# Patient Record
Sex: MALE | Race: WHITE
[De-identification: names, ages, dates, MRNs, and addresses within clinical notes are randomized per-mention and may not be internally consistent; named-entity substitution may affect disease eponyms.]

---

## 2017-04-07 ENCOUNTER — APPOINTMENT (OUTPATIENT)
Dept: RADIOLOGY | Facility: CLINIC | Age: 27
End: 2017-04-07

## 2017-04-07 ENCOUNTER — OUTPATIENT (OUTPATIENT)
Dept: OUTPATIENT SERVICES | Facility: HOSPITAL | Age: 27
LOS: 1 days | End: 2017-04-07
Payer: COMMERCIAL

## 2017-04-07 DIAGNOSIS — Z00.8 ENCOUNTER FOR OTHER GENERAL EXAMINATION: ICD-10-CM

## 2017-04-07 PROBLEM — Z00.00 ENCOUNTER FOR PREVENTIVE HEALTH EXAMINATION: Status: ACTIVE | Noted: 2017-04-07

## 2017-04-07 PROCEDURE — 71046 X-RAY EXAM CHEST 2 VIEWS: CPT

## 2022-04-19 ENCOUNTER — NON-APPOINTMENT (OUTPATIENT)
Age: 32
End: 2022-04-19

## 2022-04-22 ENCOUNTER — LABORATORY RESULT (OUTPATIENT)
Age: 32
End: 2022-04-22

## 2022-04-22 ENCOUNTER — APPOINTMENT (OUTPATIENT)
Dept: RHEUMATOLOGY | Facility: CLINIC | Age: 32
End: 2022-04-22
Payer: COMMERCIAL

## 2022-04-22 VITALS
HEART RATE: 67 BPM | HEIGHT: 71 IN | TEMPERATURE: 96.9 F | OXYGEN SATURATION: 99 % | BODY MASS INDEX: 21.7 KG/M2 | DIASTOLIC BLOOD PRESSURE: 76 MMHG | SYSTOLIC BLOOD PRESSURE: 117 MMHG | WEIGHT: 155 LBS

## 2022-04-22 PROCEDURE — 36415 COLL VENOUS BLD VENIPUNCTURE: CPT

## 2022-04-22 PROCEDURE — 99205 OFFICE O/P NEW HI 60 MIN: CPT | Mod: 25

## 2022-04-22 NOTE — HISTORY OF PRESENT ILLNESS
[FreeTextEntry1] : 33 y/o male referred to rheumatology for management of RA.\par \par Pt started to have symptoms 7 years ago. Pt was diagnosed with RA and ?gout and treated by Dr. Last. Pt was on SSZ which worked for 2 years then stopped working. Then he was on MTX x 3 months but stopped due non-improvement. Pt was about to start Humira but pt's symptoms resolved with change in diet. Pt started to have more symptoms more recently. Pt was treated for gout with allopurinol and colchicine but did not help with the flares.\par Pt has been on PDN 20mg/day x 6 weeks.\par \par Pt has symptoms of swelling pain worst at night - migratory - hands, arms, feet, ankles, typically 1 side a time.\par Denies other symptoms.\par \par Patient denies fatigue, fever, chills, nasopharyngeal ulcers, chest pain, abdominal pain, cough, SOB, nausea, vomiting, diarrhea, blood in stool, hematuria, rash, Raynaud's, dry eyes, dry mouth, Hx of DVT/PEs.\par ROS negative unless otherwise noted above.\par \par Cousin - RA\par \par WORKUP:\par Remarkable for: ESR 45 (2/2017), CRP 5.0 (2/2017)\par Normal/neg: CBC, CMP, EWA, RF, CCP, HLA-B27, 14.3.3 eta, ACE level, tick-borne disease panel, HSV, HIV, Chlamydia, RPR, Hep B/C, Quantiferon TB\par CT L ankle (2/2017): evidence of osteopenia of ankle bones. Large os trigonum the posterior aspect of the ankle.\par MR L ankle (2/2017): Partial tear anterior tibiofibular ligament, anterior talofibular ligament, and calcaneofibular ligament with sprain deep fibers of the deltoid. Nondisplaced fracture plantar medial talar body adjacent to the medial facet of the subtalar joint. Contusion distal fibula. Large tibiotalar joint effusion. Prominent os trigonum with posttraumatic disruption of the synchondrosis. Posterior soft tissue edema with synovitis and tenosynovitis of the flexor hallucis.

## 2022-04-22 NOTE — ASSESSMENT
[FreeTextEntry1] : 33 y/o male referred to rheumatology for management of RA.\par \par Pt started to have symptoms 7 years ago. Pt was diagnosed with RA and ?gout and treated by Dr. Last. Pt was on SSZ which worked for 2 years then stopped working. Then he was on MTX x 3 months but stopped due non-improvement. Pt was about to start Humira but pt's symptoms resolved with change in diet. Pt started to have more symptoms more recently. Pt was treated for gout with allopurinol and colchicine but did not help with the flares.\par Pt has been on PDN 20mg/day x 6 weeks.\par Pt has symptoms of swelling pain worst at night - migratory - hands, arms, feet, ankles, typically 1 side a time.\par Denies other symptoms.\par Cousin - RA\par Workup by previous rheum showed high inflammatory markers but negative markers for autoimmune diseases, 14.3.3 eta.\par \par Patient has recurrent migratory inflammatory arthritis. Differentials include seronegative RA, spondyloarthritis, crystalline arthritis. At this time, given no strong evidence of other diseases, patient should be treated for seronegative RA.\par Pt has failed SSZ and MTX.\par \par - Obtain labwork to evaluate inflammatory arthritis and medication safety\par - Obtain XR of b/l hands/wrists, ankles/feet\par - Will call pt with results and if safety labs negative, start Humira. RTC 2 months for follow up.

## 2022-04-23 LAB
25(OH)D3 SERPL-MCNC: 34.4 NG/ML
ALBUMIN SERPL ELPH-MCNC: 4.5 G/DL
ALP BLD-CCNC: 58 U/L
ALT SERPL-CCNC: 23 U/L
ANION GAP SERPL CALC-SCNC: 14 MMOL/L
AST SERPL-CCNC: 21 U/L
BASOPHILS # BLD AUTO: 0.03 K/UL
BASOPHILS NFR BLD AUTO: 0.3 %
BILIRUB SERPL-MCNC: 0.2 MG/DL
BUN SERPL-MCNC: 14 MG/DL
CALCIUM SERPL-MCNC: 9.6 MG/DL
CHLORIDE SERPL-SCNC: 101 MMOL/L
CHOLEST SERPL-MCNC: 248 MG/DL
CO2 SERPL-SCNC: 27 MMOL/L
CREAT SERPL-MCNC: 0.92 MG/DL
CRP SERPL-MCNC: 8 MG/L
EGFR: 113 ML/MIN/1.73M2
EOSINOPHIL # BLD AUTO: 0.11 K/UL
EOSINOPHIL NFR BLD AUTO: 1.2 %
ERYTHROCYTE [SEDIMENTATION RATE] IN BLOOD BY WESTERGREN METHOD: 21 MM/HR
FERRITIN SERPL-MCNC: 137 NG/ML
GLUCOSE SERPL-MCNC: 96 MG/DL
HBV CORE IGG+IGM SER QL: NONREACTIVE
HBV SURFACE AB SER QL: REACTIVE
HBV SURFACE AG SER QL: NONREACTIVE
HCT VFR BLD CALC: 47.4 %
HCV AB SER QL: NONREACTIVE
HCV S/CO RATIO: 0.12 S/CO
HDLC SERPL-MCNC: 88 MG/DL
HGB BLD-MCNC: 14.7 G/DL
IMM GRANULOCYTES NFR BLD AUTO: 0.2 %
IRON SATN MFR SERPL: 22 %
IRON SERPL-MCNC: 48 UG/DL
LDLC SERPL CALC-MCNC: 144 MG/DL
LYMPHOCYTES # BLD AUTO: 1.74 K/UL
LYMPHOCYTES NFR BLD AUTO: 18.8 %
MAN DIFF?: NORMAL
MCHC RBC-ENTMCNC: 27.5 PG
MCHC RBC-ENTMCNC: 31 GM/DL
MCV RBC AUTO: 88.8 FL
MONOCYTES # BLD AUTO: 0.61 K/UL
MONOCYTES NFR BLD AUTO: 6.6 %
NEUTROPHILS # BLD AUTO: 6.74 K/UL
NEUTROPHILS NFR BLD AUTO: 72.9 %
NONHDLC SERPL-MCNC: 160 MG/DL
PLATELET # BLD AUTO: 280 K/UL
POTASSIUM SERPL-SCNC: 5.5 MMOL/L
PROT SERPL-MCNC: 7.4 G/DL
RBC # BLD: 5.34 M/UL
RBC # FLD: 14.6 %
RHEUMATOID FACT SER QL: <10 IU/ML
SODIUM SERPL-SCNC: 141 MMOL/L
THYROGLOB AB SERPL-ACNC: <20 IU/ML
THYROPEROXIDASE AB SERPL IA-ACNC: 20.1 IU/ML
TIBC SERPL-MCNC: 214 UG/DL
TRIGL SERPL-MCNC: 82 MG/DL
TSH SERPL-ACNC: 1.52 UIU/ML
UIBC SERPL-MCNC: 166 UG/DL
URATE SERPL-MCNC: 4.1 MG/DL
WBC # FLD AUTO: 9.25 K/UL

## 2022-04-25 LAB
ACE BLD-CCNC: 24 U/L
C3 SERPL-MCNC: 111 MG/DL
C4 SERPL-MCNC: 30 MG/DL
DEPRECATED KAPPA LC FREE/LAMBDA SER: 1.38 RATIO
ENA RNP AB SER IA-ACNC: <0.2 AL
ENA SM AB SER IA-ACNC: <0.2 AL
ENA SS-A AB SER IA-ACNC: <0.2 AL
ENA SS-B AB SER IA-ACNC: <0.2 AL
IGA SER QL IEP: 544 MG/DL
IGG SER QL IEP: 1119 MG/DL
IGM SER QL IEP: 170 MG/DL
KAPPA LC CSF-MCNC: 1.21 MG/DL
KAPPA LC SERPL-MCNC: 1.67 MG/DL
M TB IFN-G BLD-IMP: NEGATIVE
QUANTIFERON TB PLUS MITOGEN MINUS NIL: 9.96 IU/ML
QUANTIFERON TB PLUS NIL: 0.04 IU/ML
QUANTIFERON TB PLUS TB1 MINUS NIL: 0.1 IU/ML
QUANTIFERON TB PLUS TB2 MINUS NIL: 0.05 IU/ML
TESTOST FREE SERPL-MCNC: 2.9 PG/ML
TESTOST SERPL-MCNC: 339 NG/DL

## 2022-04-26 LAB
CCP AB SER IA-ACNC: <8 UNITS
DSDNA AB SER-ACNC: <12 IU/ML
RF+CCP IGG SER-IMP: NEGATIVE

## 2022-04-27 LAB
ALBUMIN MFR SERPL ELPH: 58.5 %
ALBUMIN SERPL-MCNC: 4.3 G/DL
ALBUMIN/GLOB SERPL: 1.4 RATIO
ALPHA1 GLOB MFR SERPL ELPH: 4.5 %
ALPHA1 GLOB SERPL ELPH-MCNC: 0.3 G/DL
ALPHA2 GLOB MFR SERPL ELPH: 8.6 %
ALPHA2 GLOB SERPL ELPH-MCNC: 0.6 G/DL
ANA SER IF-ACNC: NEGATIVE
B-GLOBULIN MFR SERPL ELPH: 13.1 %
B-GLOBULIN SERPL ELPH-MCNC: 1 G/DL
GAMMA GLOB FLD ELPH-MCNC: 1.1 G/DL
GAMMA GLOB MFR SERPL ELPH: 15.3 %
INTERPRETATION SERPL IEP-IMP: NORMAL
PROT SERPL-MCNC: 7.4 G/DL
PROT SERPL-MCNC: 7.4 G/DL

## 2022-04-28 DIAGNOSIS — R79.89 OTHER SPECIFIED ABNORMAL FINDINGS OF BLOOD CHEMISTRY: ICD-10-CM

## 2022-04-29 ENCOUNTER — TRANSCRIPTION ENCOUNTER (OUTPATIENT)
Age: 32
End: 2022-04-29

## 2022-04-29 LAB — HLA-B27 RELATED AG QL: NEGATIVE

## 2022-05-04 LAB — 14-3-3 ETA AG SER IA-MCNC: <0.2 NG/ML

## 2022-06-24 ENCOUNTER — APPOINTMENT (OUTPATIENT)
Dept: RHEUMATOLOGY | Facility: CLINIC | Age: 32
End: 2022-06-24

## 2022-06-24 VITALS
RESPIRATION RATE: 16 BRPM | HEIGHT: 71 IN | SYSTOLIC BLOOD PRESSURE: 125 MMHG | TEMPERATURE: 97.1 F | DIASTOLIC BLOOD PRESSURE: 67 MMHG | BODY MASS INDEX: 22.4 KG/M2 | OXYGEN SATURATION: 100 % | HEART RATE: 60 BPM | WEIGHT: 160 LBS

## 2022-06-24 PROCEDURE — 99214 OFFICE O/P EST MOD 30 MIN: CPT

## 2022-06-24 NOTE — ASSESSMENT
[FreeTextEntry1] : 31 y/o male presents as follow up for management of RA. \par Pt started to have symptoms 7 years ago. Pt was diagnosed with RA and ?gout and treated by Dr. Last. Pt was on SSZ which worked for 2 years then stopped working. Then he was on MTX x 3 months but stopped due to non-improvement. Pt was about to start Humira but pt's symptoms resolved with change in diet. Pt started to have more symptoms more recently. Pt was treated for gout with allopurinol and colchicine but did not help with the flares. \par Pt has been on PDN 20mg/day x 6 weeks. \par Pt has symptoms of swelling pain worst at night - migratory - hands, arms, feet, ankles, typically 1 side a time. \par Denies other symptoms. \par Cousin - RA \par \par Patient has recurrent migratory inflammatory arthritis. Differentials include seronegative RA, spondyloarthritis, crystalline arthritis. At this time, given no strong evidence of other diseases, patient should be treated for seronegative RA. \par Pt has failed SSZ and MTX.\par \par Workup by previous rheum showed high inflammatory markers but negative markers for autoimmune diseases, 14.3.3 eta. Pt was started on Humira by me on 4/2022 with great improvement in his inflammatory arthritis.\par \par - c/w Humira 40mg SQ [id number]\par - Pt will obtain XR of b/l hands/wrists, ankles/feet ordered last visit\par - Pt deferred endocrinology follow up for low testosterone which may not be clinically significant - pt will let me know if he wants the referral again.\par - RTC 4 months for follow up.\par

## 2022-06-24 NOTE — HISTORY OF PRESENT ILLNESS
[FreeTextEntry1] : HISTORY: \par 31 y/o male presents as follow up for management of RA. \par Pt started to have symptoms 7 years ago. Pt was diagnosed with RA and ?gout and treated by Dr. Last. Pt was on SSZ which worked for 2 years then stopped working. Then he was on MTX x 3 months but stopped due to non-improvement. Pt was about to start Humira but pt's symptoms resolved with change in diet. Pt started to have more symptoms more recently. Pt was treated for gout with allopurinol and colchicine but did not help with the flares. \par Pt has been on PDN 20mg/day x 6 weeks. \par Pt has symptoms of swelling pain worst at night - migratory - hands, arms, feet, ankles, typically 1 side a time. \par Denies other symptoms. \par Cousin - RA \par \par Workup by previous rheum showed high inflammatory markers but negative markers for autoimmune diseases, 14.3.3 eta. \par \par Patient has recurrent migratory inflammatory arthritis. Differentials include seronegative RA, spondyloarthritis, crystalline arthritis. At this time, given no strong evidence of other diseases, patient should be treated for seronegative RA. \par Pt has failed SSZ and MTX. \par \par INTERVAL HISTORY: \par Pt started Humira about 6 weeks ago, since then, patient reports great improvement in his joint pains and is back to working out. Reports some pain in R 1st MTP bunion with use of foot. Denies any side effects of Humira and feels well in general. Denies any systemic symptoms. Pt has not done XR from last visit yet.\par \par WORKUP: \par Remarkable for (4/2022): CRP 8 (nml <= 4), ESR 21 \par Normal/neg (4/2022): CBC, CMP, EWA, dsDNA, Brady, RNP, SSA, SSB, C3, C4, Thyroid Ab, ANCAs, RF, CCP, HLA-B27, ACE level, 14.3.3 eta, uric acid, DAYANARA, TSH, ferritin, iron panel, Hep B/C panel, Quantiferon TB \par Remarkable for: ESR 45 (2/2017), CRP 5.0 (2/2017) \par Normal/neg: CBC, CMP, EWA, RF, CCP, HLA-B27, 14.3.3 eta, ACE level, tick-borne disease panel, HSV, HIV, Chlamydia, RPR, Hep B/C, Quantiferon TB \par CT L ankle (2/2017): evidence of osteopenia of ankle bones. Large os trigonum the posterior aspect of the ankle. \par MR L ankle (2/2017): Partial tear anterior tibiofibular ligament, anterior talofibular ligament, and calcaneofibular ligament with sprain deep fibers of the deltoid. Nondisplaced fracture plantar medial talar body adjacent to the medial facet of the subtalar joint. Contusion distal fibula. Large tibiotalar joint effusion. Prominent os trigonum with posttraumatic disruption of the synchondrosis. Posterior soft tissue edema with synovitis and tenosynovitis of the flexor hallucis \par

## 2022-09-23 ENCOUNTER — APPOINTMENT (OUTPATIENT)
Dept: RHEUMATOLOGY | Facility: CLINIC | Age: 32
End: 2022-09-23

## 2022-09-23 VITALS
HEIGHT: 70 IN | SYSTOLIC BLOOD PRESSURE: 122 MMHG | BODY MASS INDEX: 25.05 KG/M2 | OXYGEN SATURATION: 100 % | TEMPERATURE: 97 F | DIASTOLIC BLOOD PRESSURE: 67 MMHG | WEIGHT: 175 LBS | HEART RATE: 70 BPM

## 2022-09-23 PROCEDURE — 36415 COLL VENOUS BLD VENIPUNCTURE: CPT

## 2022-09-23 PROCEDURE — 99214 OFFICE O/P EST MOD 30 MIN: CPT | Mod: 25

## 2022-09-23 NOTE — HISTORY OF PRESENT ILLNESS
[FreeTextEntry1] : 31 y/o male presents as follow up for management of seronegative RA.  \par Pt started to have symptoms 7 years ago. Pt was diagnosed with RA and ?gout and treated by Dr. Dnun. Pt was on SSZ which worked for 2 years then stopped working. Then he was on MTX x 3 months but stopped due to non-improvement. Pt was about to start Humira but pt's symptoms resolved with change in diet. Pt started to have more symptoms more recently. Pt was treated for gout with allopurinol and colchicine but did not help with the flares.  \par Pt has been on PDN 20mg/day x 6 weeks.  \par Pt has symptoms of swelling pain worst at night - migratory - hands, arms, feet, ankles, typically 1 side a time.  \par Denies other symptoms.  \par Cousin - RA  \par \par Patient has recurrent migratory inflammatory arthritis. Differentials include seronegative RA, spondyloarthritis, crystalline arthritis. At this time, given no strong evidence of other diseases, patient should be treated for seronegative RA.  \par Pt has failed SSZ and MTX. \par \par Workup by previous rheumatologist and I showed high inflammatory markers but negative markers for autoimmune diseases, 14.3.3 eta. Pt was started on Humira by me on 4/2022 for seronegative RA with great improvement in his inflammatory arthritis. \par \par INTERVAL HISTORY: \par Pt continues to do well on Humira without any side effects. Pt has not had any major flares of RA since last visit and did not require any PDN.\par Denies any other concerns or symptoms.\par \par WORKUP:  \par Remarkable for (4/2022): CRP 8 (nml <= 4), ESR 21  \par Normal/neg (4/2022): CBC, CMP, EWA, dsDNA, Brady, RNP, SSA, SSB, C3, C4, Thyroid Ab, ANCAs, RF, CCP, HLA-B27, ACE level, 14.3.3 eta, uric acid, DAYANARA, TSH, ferritin, iron panel, Hep B/C panel, Quantiferon TB  \par Normal/neg (Prior workup): CBC, CMP, EWA, RF, CCP, HLA-B27, 14.3.3 eta, ACE level, tick-borne disease panel, HSV, HIV, Chlamydia, RPR, Hep B/C, Quantiferon TB  \par CT L ankle (2/2017): evidence of osteopenia of ankle bones. Large os trigonum the posterior aspect of the ankle.  \par MR L ankle (2/2017): Partial tear anterior tibiofibular ligament, anterior talofibular ligament, and calcaneofibular ligament with sprain deep fibers of the deltoid. Nondisplaced fracture plantar medial talar body adjacent to the medial facet of the subtalar joint. Contusion distal fibula. Large tibiotalar joint effusion. Prominent os trigonum with posttraumatic disruption of the synchondrosis. Posterior soft tissue edema with synovitis and tenosynovitis of the flexor hallucis\par

## 2022-09-23 NOTE — ASSESSMENT
[FreeTextEntry1] : 33 y/o male presents as follow up for management of seronegative RA.  \par Pt started to have symptoms 7 years ago. Pt was diagnosed with RA and ?gout and treated by Dr. Dunn. Pt was on SSZ which worked for 2 years then stopped working. Then he was on MTX x 3 months but stopped due to non-improvement. Pt was about to start Humira but pt's symptoms resolved with change in diet. Pt started to have more symptoms more recently. Pt was treated for gout with allopurinol and colchicine but did not help with the flares.  \par Pt has been on PDN 20mg/day x 6 weeks.  \par Pt has symptoms of swelling pain worst at night - migratory - hands, arms, feet, ankles, typically 1 side a time.  \par Denies other symptoms.  \par Cousin - RA  \par \par Patient has recurrent migratory inflammatory arthritis. Differentials include seronegative RA, spondyloarthritis, crystalline arthritis. At this time, given no strong evidence of other diseases, patient should be treated for seronegative RA.  \par Pt has failed SSZ and MTX. \par \par Workup by previous rheumatologist and I showed high inflammatory markers but negative markers for autoimmune diseases, 14.3.3 eta. Pt was started on Humira by me on 4/2022 for seronegative RA with great improvement in his inflammatory arthritis. \par \par - Obtain labwork for disease activity and medication safety.\par - c/w Humira 40mg SQ [id number]. Side effects of Humira were discussed with the pt in detail including increased risk of infection, demyelinating disease, re-activation of latent TB, possible increased risk of solid and skin tumors. Advised pt to seek medical care ASAP if he/she has an infection and advised to stop the medication until infection resolves.\par This is a high-risk therapy requiring intense monitoring for toxicity.\par Hepatitis panel and TB negative 4/2022.\par - Ordered XR of b/l hands/wrists, ankles/feet again - to evaluate any erosions and to be used for monitoring.\par - RTC 3 months for follow up. If stable by next visit, will consider follow ups further apart.\par

## 2022-09-24 LAB
ALBUMIN SERPL ELPH-MCNC: 4.8 G/DL
ALP BLD-CCNC: 43 U/L
ALT SERPL-CCNC: 49 U/L
ANION GAP SERPL CALC-SCNC: 10 MMOL/L
AST SERPL-CCNC: 65 U/L
BASOPHILS # BLD AUTO: 0.05 K/UL
BASOPHILS NFR BLD AUTO: 1 %
BILIRUB SERPL-MCNC: 0.6 MG/DL
BUN SERPL-MCNC: 17 MG/DL
CALCIUM SERPL-MCNC: 9.7 MG/DL
CHLORIDE SERPL-SCNC: 105 MMOL/L
CO2 SERPL-SCNC: 26 MMOL/L
CREAT SERPL-MCNC: 1.15 MG/DL
CRP SERPL-MCNC: <3 MG/L
EGFR: 86 ML/MIN/1.73M2
EOSINOPHIL # BLD AUTO: 0.26 K/UL
EOSINOPHIL NFR BLD AUTO: 5.3 %
ERYTHROCYTE [SEDIMENTATION RATE] IN BLOOD BY WESTERGREN METHOD: 3 MM/HR
GLUCOSE SERPL-MCNC: 110 MG/DL
HCT VFR BLD CALC: 41 %
HGB BLD-MCNC: 13.8 G/DL
IMM GRANULOCYTES NFR BLD AUTO: 0 %
LYMPHOCYTES # BLD AUTO: 2.28 K/UL
LYMPHOCYTES NFR BLD AUTO: 46.7 %
MAN DIFF?: NORMAL
MCHC RBC-ENTMCNC: 29.1 PG
MCHC RBC-ENTMCNC: 33.7 GM/DL
MCV RBC AUTO: 86.5 FL
MONOCYTES # BLD AUTO: 0.31 K/UL
MONOCYTES NFR BLD AUTO: 6.4 %
NEUTROPHILS # BLD AUTO: 1.98 K/UL
NEUTROPHILS NFR BLD AUTO: 40.6 %
PLATELET # BLD AUTO: 217 K/UL
POTASSIUM SERPL-SCNC: 4.8 MMOL/L
PROT SERPL-MCNC: 7 G/DL
RBC # BLD: 4.74 M/UL
RBC # FLD: 12.4 %
SODIUM SERPL-SCNC: 141 MMOL/L
WBC # FLD AUTO: 4.88 K/UL

## 2023-01-06 ENCOUNTER — APPOINTMENT (OUTPATIENT)
Dept: RHEUMATOLOGY | Facility: CLINIC | Age: 33
End: 2023-01-06
Payer: COMMERCIAL

## 2023-01-06 VITALS
HEART RATE: 63 BPM | HEIGHT: 70 IN | BODY MASS INDEX: 25.77 KG/M2 | RESPIRATION RATE: 18 BRPM | SYSTOLIC BLOOD PRESSURE: 106 MMHG | DIASTOLIC BLOOD PRESSURE: 68 MMHG | WEIGHT: 180 LBS | OXYGEN SATURATION: 100 %

## 2023-01-06 PROCEDURE — 99214 OFFICE O/P EST MOD 30 MIN: CPT

## 2023-01-06 NOTE — ASSESSMENT
[FreeTextEntry1] : 33 y/o male presents as follow up for management of seronegative RA.  \par Pt started to have symptoms 7 years ago. Pt was diagnosed with RA and ?gout and treated by Dr. Dunn. Pt was on SSZ which worked for 2 years then stopped working. Then he was on MTX x 3 months but stopped due to non-improvement. Pt was about to start Humira but pt's symptoms resolved with change in diet. Pt started to have more symptoms more recently. Pt was treated for gout with allopurinol and colchicine but did not help with the flares.  \par Pt has been on PDN 20mg/day x 6 weeks.  \par Pt has symptoms of swelling pain worst at night - migratory - hands, arms, feet, ankles, typically 1 side a time.  \par Denies other symptoms.  \par Cousin - RA\par \par Patient has recurrent migratory inflammatory arthritis. Given no strong evidence of other causes of inflammatory arthritis, patient will be treated for seronegative RA.  \par Pt has failed SSZ and MTX. \par \par Workup by previous rheumatologist and I showed high inflammatory markers but negative markers for autoimmune diseases, 14.3.3 eta. XR hands/feet without erosions. Pt was started on Humira by me on 4/2022 for seronegative RA with great improvement in his inflammatory arthritis.  \par \par - Will obtain labwork for disease activity and medication safety (including repeat Hep panel and TB) on next visit\par - c/w Humira 40mg SQ [id number]. Side effects of Humira were discussed with the pt in detail including increased risk of infection, demyelinating disease, re-activation of latent TB, possible increased risk of solid and skin tumors. Advised pt to seek medical care ASAP if he/she has an infection and advised to stop the medication until infection resolves. \par This is a high-risk therapy requiring intense monitoring for toxicity. \par Hepatitis panel and TB negative 4/2022. \par - RTC 3 months for follow up. If stable by next visit, will consider follow ups further apart. \par

## 2023-01-06 NOTE — HISTORY OF PRESENT ILLNESS
[FreeTextEntry1] : HISTORY: \par 31 y/o male presents as follow up for management of seronegative RA.  \par Pt started to have symptoms 7 years ago. Pt was diagnosed with RA and ?gout and treated by Dr. Dunn. Pt was on SSZ which worked for 2 years then stopped working. Then he was on MTX x 3 months but stopped due to non-improvement. Pt was about to start Humira but pt's symptoms resolved with change in diet. Pt started to have more symptoms more recently. Pt was treated for gout with allopurinol and colchicine but did not help with the flares.  \par Pt has been on PDN 20mg/day x 6 weeks.  \par Pt has symptoms of swelling pain worst at night - migratory - hands, arms, feet, ankles, typically 1 side a time.  \par Denies other symptoms.  \par Cousin - RA\par \par Patient has recurrent migratory inflammatory arthritis. Differentials include seronegative RA, spondyloarthritis, crystalline arthritis. At this time, given no strong evidence of other diseases, patient should be treated for seronegative RA.  \par Pt has failed SSZ and MTX. \par \par Workup by previous rheumatologist and I showed high inflammatory markers but negative markers for autoimmune diseases, 14.3.3 eta. Pt was started on Humira by me on 4/2022 for seronegative RA with great improvement in his inflammatory arthritis.  \par \par INTERVAL HISTORY: \par Pt has been doing well on Humira without any major flares. Pt has not needed to use any PDN recently.\par Pt denies any new concerns today.\par \par WORKUP:  \par Remarkable for (4/2022): CRP 8 (nml <= 4), ESR 21  \par Normal/neg (4/2022): CBC, CMP, EWA, dsDNA, Brady, RNP, SSA, SSB, C3, C4, Thyroid Ab, ANCAs, RF, CCP, HLA-B27, ACE level, 14.3.3 eta, uric acid, DAYANARA, TSH, ferritin, iron panel, Hep B/C panel, Quantiferon TB  \par Normal/neg (Prior workup): CBC, CMP, EWA, RF, CCP, HLA-B27, 14.3.3 eta, ACE level, tick-borne disease panel, HSV, HIV, Chlamydia, RPR, Hep B/C, Quantiferon TB \par XR b/l hands/wrists (11/2022): Normal\par \par XR b/l ankles/feet (11/2022): Normal \par CT L ankle (2/2017): evidence of osteopenia of ankle bones. Large os trigonum the posterior aspect of the ankle.  \par MR L ankle (2/2017): Partial tear anterior tibiofibular ligament, anterior talofibular ligament, and calcaneofibular ligament with sprain deep fibers of the deltoid. Nondisplaced fracture plantar medial talar body adjacent to the medial facet of the subtalar joint. Contusion distal fibula. Large tibiotalar joint effusion. Prominent os trigonum with posttraumatic disruption of the synchondrosis. Posterior soft tissue edema with synovitis and tenosynovitis of the flexor hallucis \par \par

## 2023-03-31 ENCOUNTER — APPOINTMENT (OUTPATIENT)
Dept: RHEUMATOLOGY | Facility: CLINIC | Age: 33
End: 2023-03-31
Payer: COMMERCIAL

## 2023-03-31 VITALS
RESPIRATION RATE: 18 BRPM | HEART RATE: 60 BPM | DIASTOLIC BLOOD PRESSURE: 75 MMHG | OXYGEN SATURATION: 100 % | SYSTOLIC BLOOD PRESSURE: 120 MMHG | BODY MASS INDEX: 24.34 KG/M2 | WEIGHT: 170 LBS | HEIGHT: 70 IN

## 2023-03-31 PROCEDURE — 36415 COLL VENOUS BLD VENIPUNCTURE: CPT

## 2023-03-31 PROCEDURE — 99214 OFFICE O/P EST MOD 30 MIN: CPT | Mod: 25

## 2023-03-31 RX ORDER — PREDNISONE 5 MG/1
5 TABLET ORAL
Qty: 120 | Refills: 2 | Status: COMPLETED | COMMUNITY
Start: 2022-04-22 | End: 2023-03-31

## 2023-03-31 NOTE — ASSESSMENT
[FreeTextEntry1] : 33 y/o male presents as follow up for management of seronegative RA.  \par Pt started to have symptoms 7 years ago. Pt was diagnosed with RA and ?gout and treated by Dr. Dunn. Pt was on SSZ which worked for 2 years then stopped working. Then he was on MTX x 3 months but stopped due to non-improvement. Pt was about to start Humira but pt's symptoms resolved with change in diet. Pt started to have more symptoms more recently. Pt was treated for gout with allopurinol and colchicine but did not help with the flares.  \par Pt has been on PDN 20mg/day x 6 weeks.  \par Pt has symptoms of swelling pain worst at night - migratory - hands, arms, feet, ankles, typically 1 side a time.  \par Denies other symptoms.  \par Cousin - RA \par \par Patient has recurrent migratory inflammatory arthritis. Given no strong evidence of other causes of inflammatory arthritis, patient will be treated for seronegative RA.  \par Pt has failed SSZ and MTX.  \par \par Workup by previous rheumatologist and I showed high inflammatory markers but negative markers for autoimmune diseases, 14.3.3 eta. XR hands/feet without erosions. Pt was started on Humira by me on 4/2022 for seronegative RA with great improvement in his inflammatory arthritis. \par \par - Obtain labwork for disease activity and medication safety\par - c/w Humira 40mg SQ [id number]. Side effects of Humira were discussed with the pt in detail including increased risk of infection, demyelinating disease, re-activation of latent TB, possible increased risk of solid and skin tumors. Advised pt to seek medical care ASAP if he/she has an infection and advised to stop the medication until infection resolves.  \par This is a high-risk therapy requiring intense monitoring for toxicity.  \par Hepatitis panel and TB negative 4/2022. Repeating today\par - RTC 6 months for follow up. If stable by next visit, will consider follow ups further apart. \par \par

## 2023-03-31 NOTE — HISTORY OF PRESENT ILLNESS
[FreeTextEntry1] : HISTORY: \par 31 y/o male presents as follow up for management of seronegative RA.  \par Pt started to have symptoms 7 years ago. Pt was diagnosed with RA and ?gout and treated by Dr. Dunn. Pt was on SSZ which worked for 2 years then stopped working. Then he was on MTX x 3 months but stopped due to non-improvement. Pt was about to start Humira but pt's symptoms resolved with change in diet. Pt started to have more symptoms more recently. Pt was treated for gout with allopurinol and colchicine but did not help with the flares.  \par Pt has been on PDN 20mg/day x 6 weeks.  \par Pt has symptoms of swelling pain worst at night - migratory - hands, arms, feet, ankles, typically 1 side a time.  \par Denies other symptoms.  \par Cousin - RA \par \par Patient has recurrent migratory inflammatory arthritis. Given no strong evidence of other causes of inflammatory arthritis, patient will be treated for seronegative RA.  \par \par Pt has failed SSZ and MTX.  \par \par Workup by previous rheumatologist and I showed high inflammatory markers but negative markers for autoimmune diseases, 14.3.3 eta. XR hands/feet without erosions. Pt was started on Humira by me on 4/2022 for seronegative RA with great improvement in his inflammatory arthritis. \par \par INTERVAL HISTORY: \par Pt feels well and does not have any concerns today.\par \par WORKUP:  \par Remarkable for (4/2022): CRP 8 (nml <= 4), ESR 21  \par Normal/neg (4/2022): CBC, CMP, EWA, dsDNA, Brady, RNP, SSA, SSB, C3, C4, Thyroid Ab, ANCAs, RF, CCP, HLA-B27, ACE level, 14.3.3 eta, uric acid, DAYANARA, TSH, ferritin, iron panel, Hep B/C panel, Quantiferon TB  \par Normal/neg (Prior workup): CBC, CMP, EWA, RF, CCP, HLA-B27, 14.3.3 eta, ACE level, tick-borne disease panel, HSV, HIV, Chlamydia, RPR, Hep B/C, Quantiferon TB  \par XR b/l hands/wrists (11/2022): Normal \par XR b/l ankles/feet (11/2022): Normal  \par CT L ankle (2/2017): evidence of osteopenia of ankle bones. Large os trigonum the posterior aspect of the ankle.  \par MR L ankle (2/2017): Partial tear anterior tibiofibular ligament, anterior talofibular ligament, and calcaneofibular ligament with sprain deep fibers of the deltoid. Nondisplaced fracture plantar medial talar body adjacent to the medial facet of the subtalar joint. Contusion distal fibula. Large tibiotalar joint effusion. Prominent os trigonum with posttraumatic disruption of the synchondrosis. Posterior soft tissue edema with synovitis and tenosynovitis of the flexor hallucis

## 2023-04-10 LAB
ALBUMIN SERPL ELPH-MCNC: 4.9 G/DL
ALP BLD-CCNC: 41 U/L
ALT SERPL-CCNC: 22 U/L
ANION GAP SERPL CALC-SCNC: 11 MMOL/L
AST SERPL-CCNC: 28 U/L
BASOPHILS # BLD AUTO: 0.04 K/UL
BASOPHILS NFR BLD AUTO: 0.8 %
BILIRUB SERPL-MCNC: 0.5 MG/DL
BUN SERPL-MCNC: 16 MG/DL
CALCIUM SERPL-MCNC: 9.7 MG/DL
CHLORIDE SERPL-SCNC: 104 MMOL/L
CO2 SERPL-SCNC: 27 MMOL/L
CREAT SERPL-MCNC: 1.02 MG/DL
CRP SERPL-MCNC: <3 MG/L
EGFR: 100 ML/MIN/1.73M2
EOSINOPHIL # BLD AUTO: 0.11 K/UL
EOSINOPHIL NFR BLD AUTO: 2.2 %
ERYTHROCYTE [SEDIMENTATION RATE] IN BLOOD BY WESTERGREN METHOD: 2 MM/HR
GLUCOSE SERPL-MCNC: 105 MG/DL
HBV CORE IGG+IGM SER QL: NONREACTIVE
HBV SURFACE AB SER QL: REACTIVE
HBV SURFACE AG SER QL: NONREACTIVE
HCT VFR BLD CALC: 44.9 %
HCV AB SER QL: NONREACTIVE
HCV S/CO RATIO: 0.1 S/CO
HGB BLD-MCNC: 14.6 G/DL
IMM GRANULOCYTES NFR BLD AUTO: 0.4 %
LYMPHOCYTES # BLD AUTO: 2.14 K/UL
LYMPHOCYTES NFR BLD AUTO: 42.4 %
M TB IFN-G BLD-IMP: NEGATIVE
MAN DIFF?: NORMAL
MCHC RBC-ENTMCNC: 28.7 PG
MCHC RBC-ENTMCNC: 32.5 GM/DL
MCV RBC AUTO: 88.2 FL
MONOCYTES # BLD AUTO: 0.34 K/UL
MONOCYTES NFR BLD AUTO: 6.7 %
NEUTROPHILS # BLD AUTO: 2.4 K/UL
NEUTROPHILS NFR BLD AUTO: 47.5 %
PLATELET # BLD AUTO: 242 K/UL
POTASSIUM SERPL-SCNC: 5.1 MMOL/L
PROT SERPL-MCNC: 7.7 G/DL
QUANTIFERON TB PLUS MITOGEN MINUS NIL: >10 IU/ML
QUANTIFERON TB PLUS NIL: 0.04 IU/ML
QUANTIFERON TB PLUS TB1 MINUS NIL: 0.01 IU/ML
QUANTIFERON TB PLUS TB2 MINUS NIL: -0.01 IU/ML
RBC # BLD: 5.09 M/UL
RBC # FLD: 13.2 %
SODIUM SERPL-SCNC: 142 MMOL/L
WBC # FLD AUTO: 5.05 K/UL

## 2023-10-13 ENCOUNTER — APPOINTMENT (OUTPATIENT)
Dept: RHEUMATOLOGY | Facility: CLINIC | Age: 33
End: 2023-10-13
Payer: COMMERCIAL

## 2023-10-13 VITALS
OXYGEN SATURATION: 100 % | TEMPERATURE: 96.3 F | DIASTOLIC BLOOD PRESSURE: 65 MMHG | HEIGHT: 70 IN | BODY MASS INDEX: 24.34 KG/M2 | SYSTOLIC BLOOD PRESSURE: 100 MMHG | HEART RATE: 55 BPM | WEIGHT: 170 LBS

## 2023-10-13 PROCEDURE — 99214 OFFICE O/P EST MOD 30 MIN: CPT

## 2024-02-23 ENCOUNTER — APPOINTMENT (OUTPATIENT)
Dept: RHEUMATOLOGY | Facility: CLINIC | Age: 34
End: 2024-02-23
Payer: COMMERCIAL

## 2024-02-23 VITALS
HEIGHT: 70 IN | OXYGEN SATURATION: 100 % | TEMPERATURE: 96.9 F | BODY MASS INDEX: 25.05 KG/M2 | WEIGHT: 175 LBS | HEART RATE: 63 BPM | SYSTOLIC BLOOD PRESSURE: 117 MMHG | DIASTOLIC BLOOD PRESSURE: 72 MMHG

## 2024-02-23 DIAGNOSIS — Z79.899 OTHER LONG TERM (CURRENT) DRUG THERAPY: ICD-10-CM

## 2024-02-23 DIAGNOSIS — M19.90 UNSPECIFIED OSTEOARTHRITIS, UNSPECIFIED SITE: ICD-10-CM

## 2024-02-23 DIAGNOSIS — M06.9 RHEUMATOID ARTHRITIS, UNSPECIFIED: ICD-10-CM

## 2024-02-23 PROCEDURE — 36415 COLL VENOUS BLD VENIPUNCTURE: CPT

## 2024-02-23 PROCEDURE — G2211 COMPLEX E/M VISIT ADD ON: CPT

## 2024-02-23 PROCEDURE — 99214 OFFICE O/P EST MOD 30 MIN: CPT

## 2024-02-23 NOTE — PHYSICAL EXAM
[TextEntry] : GENERAL: Appears in no acute distress HEENT: EOMI, PERRLA. No conjunctival erythema. Moist mucous membranes. No nasopharyngeal ulcers NECK: Supple, no cervical lymphadenopathy, no thyromegaly CARDIOVASCULAR: RRR. S1, S2 auscultated. No murmurs or rubs. PULMONARY: Clear to auscultation b/l, no wheezes, rales, or crackles ABDOMINAL: Soft, nontender, nondistended. Bowel sounds present. No organomegaly. MSK: No synovitis. R 1st MTP bunion No tenderness to palpation Normal ROM of neck, back, b/l upper and lower extremities. SKIN: L>>R flaky ears with mild erythema NEURO: No focal deficits. PSYCH: AAOx3. Normal affect and thought process.

## 2024-02-23 NOTE — HISTORY OF PRESENT ILLNESS
[FreeTextEntry1] : HISTORY:  34 y/o male presents as follow up for management of seronegative RA.  Pt started to have symptoms 7 years ago. Pt was diagnosed with RA and ?gout and treated by Dr. Dunn. Pt was on SSZ which worked for 2 years then stopped working. Then he was on MTX x 3 months but stopped due to non-improvement. Pt was about to start Humira but pt's symptoms resolved with change in diet. Pt started to have more symptoms more recently. Pt was treated for gout with allopurinol and colchicine but did not help with the flares.  Pt has been on PDN 20mg/day x 6 weeks.  Pt has symptoms of swelling pain worst at night - migratory - hands, arms, feet, ankles, typically 1 side a time.  Denies other symptoms.  Cousin - RA   Patient has recurrent migratory inflammatory arthritis. Workup by previous rheumatologist and I showed high inflammatory markers but negative markers for autoimmune diseases, 14.3.3 eta. XR hands/feet without erosions. Given no strong evidence of other causes of inflammatory arthritis, patient will be treated for seronegative RA.  Pt has failed SSZ and MTX in past. Pt was started on Humira by me on 4/2022 for seronegative RA with great improvement in his inflammatory arthritis.   INTERVAL HISTORY:  Pt has not been able to follow up with dermatology for R buttocks abscess, armpit fungal infection and abscess. Eczema of L>R ear has also been active. Pt's inflammatory arthritis is stable on Humira.  WORKUP:  Remarkable for (4/2022): CRP 8 (nml <= 4), ESR 21  Normal/neg (4/2022): CBC, CMP, EWA, dsDNA, Brady, RNP, SSA, SSB, C3, C4, Thyroid Ab, ANCAs, RF, CCP, HLA-B27, ACE level, 14.3.3 eta, uric acid, DAYANARA, TSH, ferritin, iron panel, Hep B/C panel, Quantiferon TB  Normal/neg (Prior workup): CBC, CMP, EWA, RF, CCP, HLA-B27, 14.3.3 eta, ACE level, tick-borne disease panel, HSV, HIV, Chlamydia, RPR, Hep B/C, Quantiferon TB  XR b/l hands/wrists (11/2022): Normal  XR b/l ankles/feet (11/2022): Normal  CT L ankle (2/2017): evidence of osteopenia of ankle bones. Large os trigonum the posterior aspect of the ankle.  MR L ankle (2/2017): Partial tear anterior tibiofibular ligament, anterior talofibular ligament, and calcaneofibular ligament with sprain deep fibers of the deltoid. Nondisplaced fracture plantar medial talar body adjacent to the medial facet of the subtalar joint. Contusion distal fibula. Large tibiotalar joint effusion. Prominent os trigonum with posttraumatic disruption of the synchondrosis. Posterior soft tissue edema with synovitis and tenosynovitis of the flexor hallucis

## 2024-02-23 NOTE — ASSESSMENT
[FreeTextEntry1] : 34 y/o male presents as follow up for management of seronegative RA.  Pt started to have symptoms 7 years ago. Pt was diagnosed with RA and ?gout and treated by Dr. Dunn. Pt was on SSZ which worked for 2 years then stopped working. Then he was on MTX x 3 months but stopped due to non-improvement. Pt was about to start Humira but pt's symptoms resolved with change in diet. Pt started to have more symptoms more recently. Pt was treated for gout with allopurinol and colchicine but did not help with the flares.  Pt has been on PDN 20mg/day x 6 weeks.  Pt has symptoms of swelling pain worst at night - migratory - hands, arms, feet, ankles, typically 1 side a time.  Denies other symptoms.  Cousin - RA   Patient has recurrent migratory inflammatory arthritis. Workup by previous rheumatologist and I showed high inflammatory markers but negative markers for autoimmune diseases, 14.3.3 eta. XR hands/feet without erosions. Given no strong evidence of other causes of inflammatory arthritis, patient will be treated for seronegative RA.  Pt has failed SSZ and MTX in past. Pt was started on Humira by me on 4/2022 for seronegative RA with great improvement in his inflammatory arthritis.   Patient has been having some abscesses and fungal skin infections. It is unclear if this is immunosuppressive effect of Humira or not. After discussion, pt would like to stay on Humira for now, but if any concerns about increased risk of infection, will discuss switch over to Orencia which may have lower risk of infection.  - Obtain labwork for medication safety, disease activity - c/w Humira 40mg SQ [id number]. Side effects of Humira were discussed with the pt in detail including increased risk of infection, demyelinating disease, re-activation of latent TB, possible increased risk of solid and skin tumors. Advised pt to seek medical care ASAP if he/she has an infection and advised to stop the medication until infection resolves.  This is a high-risk therapy requiring intense monitoring for toxicity.  - Hepatitis panel and TB negative 3/2023. Will repeat today. - Advised on follow up with derm and ENT for abscesses and eczema treatment - RTC 6 months for follow up.

## 2024-02-24 LAB
ALBUMIN SERPL ELPH-MCNC: 4.7 G/DL
ALP BLD-CCNC: 43 U/L
ALT SERPL-CCNC: 31 U/L
ANION GAP SERPL CALC-SCNC: 13 MMOL/L
AST SERPL-CCNC: 29 U/L
BILIRUB SERPL-MCNC: 0.5 MG/DL
BUN SERPL-MCNC: 12 MG/DL
CALCIUM SERPL-MCNC: 10 MG/DL
CHLORIDE SERPL-SCNC: 102 MMOL/L
CO2 SERPL-SCNC: 26 MMOL/L
CREAT SERPL-MCNC: 1.22 MG/DL
CRP SERPL-MCNC: <3 MG/L
EGFR: 80 ML/MIN/1.73M2
ERYTHROCYTE [SEDIMENTATION RATE] IN BLOOD BY WESTERGREN METHOD: 5 MM/HR
GLUCOSE SERPL-MCNC: 107 MG/DL
HCT VFR BLD CALC: 42 %
HGB BLD-MCNC: 14.1 G/DL
MCHC RBC-ENTMCNC: 27.6 PG
MCHC RBC-ENTMCNC: 33.6 GM/DL
MCV RBC AUTO: 82.4 FL
PLATELET # BLD AUTO: 269 K/UL
POTASSIUM SERPL-SCNC: 4.8 MMOL/L
PROT SERPL-MCNC: 7.9 G/DL
RBC # BLD: 5.1 M/UL
RBC # FLD: 13.5 %
SODIUM SERPL-SCNC: 141 MMOL/L
WBC # FLD AUTO: 7.71 K/UL

## 2024-02-25 LAB
HBV CORE IGG+IGM SER QL: NONREACTIVE
HBV SURFACE AB SER QL: REACTIVE
HBV SURFACE AG SER QL: NONREACTIVE
HCV AB SER QL: NONREACTIVE
HCV S/CO RATIO: 0.1 S/CO

## 2024-02-27 LAB
M TB IFN-G BLD-IMP: NEGATIVE
QUANTIFERON TB PLUS MITOGEN MINUS NIL: >10 IU/ML
QUANTIFERON TB PLUS NIL: 0.21 IU/ML
QUANTIFERON TB PLUS TB1 MINUS NIL: -0.08 IU/ML
QUANTIFERON TB PLUS TB2 MINUS NIL: -0.13 IU/ML

## 2024-03-27 ENCOUNTER — TRANSCRIPTION ENCOUNTER (OUTPATIENT)
Age: 34
End: 2024-03-27

## 2024-05-08 ENCOUNTER — OUTPATIENT (OUTPATIENT)
Dept: OUTPATIENT SERVICES | Facility: HOSPITAL | Age: 34
LOS: 1 days | End: 2024-05-08

## 2024-05-08 ENCOUNTER — APPOINTMENT (OUTPATIENT)
Dept: INTERNAL MEDICINE | Facility: CLINIC | Age: 34
End: 2024-05-08

## 2024-05-08 ENCOUNTER — NON-APPOINTMENT (OUTPATIENT)
Age: 34
End: 2024-05-08

## 2024-05-15 RX ORDER — ADALIMUMAB 40MG/0.4ML
40 KIT SUBCUTANEOUS
Qty: 2 | Refills: 5 | Status: ACTIVE | COMMUNITY
Start: 2022-04-28

## 2024-08-23 ENCOUNTER — APPOINTMENT (OUTPATIENT)
Dept: RHEUMATOLOGY | Facility: CLINIC | Age: 34
End: 2024-08-23

## 2024-09-04 ENCOUNTER — APPOINTMENT (OUTPATIENT)
Dept: RHEUMATOLOGY | Facility: CLINIC | Age: 34
End: 2024-09-04
Payer: COMMERCIAL

## 2024-09-04 VITALS
DIASTOLIC BLOOD PRESSURE: 66 MMHG | WEIGHT: 185 LBS | BODY MASS INDEX: 26.48 KG/M2 | HEIGHT: 70 IN | HEART RATE: 59 BPM | TEMPERATURE: 97.2 F | SYSTOLIC BLOOD PRESSURE: 105 MMHG | OXYGEN SATURATION: 99 %

## 2024-09-04 DIAGNOSIS — Z79.899 OTHER LONG TERM (CURRENT) DRUG THERAPY: ICD-10-CM

## 2024-09-04 DIAGNOSIS — M19.90 UNSPECIFIED OSTEOARTHRITIS, UNSPECIFIED SITE: ICD-10-CM

## 2024-09-04 DIAGNOSIS — M06.9 RHEUMATOID ARTHRITIS, UNSPECIFIED: ICD-10-CM

## 2024-09-04 PROCEDURE — 99214 OFFICE O/P EST MOD 30 MIN: CPT

## 2024-09-04 PROCEDURE — G2211 COMPLEX E/M VISIT ADD ON: CPT

## 2024-09-04 NOTE — ASSESSMENT
[FreeTextEntry1] : 35 y/o male presents as follow up for seronegative RA.  Pt started to have symptoms 7 years ago. Pt was diagnosed with RA and ?gout and treated by Dr. Dunn. Pt was on SSZ which worked for 2 years then stopped working. Then he was on MTX x 3 months but stopped due to non-improvement. Pt was about to start Humira but pt's symptoms resolved with change in diet. Pt started to have more symptoms more recently. Pt was treated for gout with allopurinol and colchicine but did not help with the flares.  Pt has been on PDN 20mg/day x 6 weeks.  Pt has symptoms of swelling pain worst at night - migratory - hands, arms, feet, ankles, typically 1 side a time.  Denies other symptoms.  Cousin - RA   Patient has recurrent migratory inflammatory arthritis. Workup by previous rheumatologist and I showed high inflammatory markers but negative markers for autoimmune diseases, 14.3.3 eta. XR hands/feet without erosions. Given no strong evidence of other causes of inflammatory arthritis, patient will be treated for seronegative RA.  Pt has failed SSZ and MTX in past. Pt was started on Humira by me on 4/2022 for seronegative RA with great improvement in his inflammatory arthritis. If any concerns about increased risk of infection (pt has had abscesses and fungal infecitnos), will discuss switch over to Orencia which may have lower risk of infection.   - c/w Humira 40mg SQ [id number]. Side effects of Humira were discussed with the pt in detail including increased risk of infection, demyelinating disease, re-activation of latent TB, possible increased risk of solid and skin tumors. Advised pt to seek medical care ASAP if he/she has an infection and advised to stop the medication until infection resolves.  This is a high-risk therapy requiring intense monitoring for toxicity.  - Hepatitis panel and TB negative 2/2024.  - Advised on follow up with derm and ENT for abscesses and eczema treatment  - RTC 6 months for follow up.

## 2024-09-04 NOTE — HISTORY OF PRESENT ILLNESS
[FreeTextEntry1] : HISTORY:  35 y/o male presents as follow up for seronegative RA.  Pt started to have symptoms 7 years ago. Pt was diagnosed with RA and ?gout and treated by Dr. Dunn. Pt was on SSZ which worked for 2 years then stopped working. Then he was on MTX x 3 months but stopped due to non-improvement. Pt was about to start Humira but pt's symptoms resolved with change in diet. Pt started to have more symptoms more recently. Pt was treated for gout with allopurinol and colchicine but did not help with the flares.  Pt has been on PDN 20mg/day x 6 weeks.  Pt has symptoms of swelling pain worst at night - migratory - hands, arms, feet, ankles, typically 1 side a time.  Denies other symptoms.  Cousin - RA   Patient has recurrent migratory inflammatory arthritis. Workup by previous rheumatologist and I showed high inflammatory markers but negative markers for autoimmune diseases, 14.3.3 eta. XR hands/feet without erosions. Given no strong evidence of other causes of inflammatory arthritis, patient will be treated for seronegative RA.  Pt has failed SSZ and MTX in past. Pt was started on Humira by me on 4/2022 for seronegative RA with great improvement in his inflammatory arthritis.   INTERVAL HISTORY:  Pt has been continuing on Humira with good control of inflammatory arthritis. Pt's abscesses and fungal infections have been quiet without major recurrences. Pt happy with continuing with current regimen. If any concerns about increased risk of infection, will discuss switch over to Orencia which may have lower risk of infection.   WORKUP:  Remarkable for (4/2022): CRP 8 (nml <= 4), ESR 21  Normal/neg (4/2022): CBC, CMP, EWA, dsDNA, Brady, RNP, SSA, SSB, C3, C4, Thyroid Ab, ANCAs, RF, CCP, HLA-B27, ACE level, 14.3.3 eta, uric acid, DAYANARA, TSH, ferritin, iron panel, Hep B/C panel, Quantiferon TB  Normal/neg (Prior workup): CBC, CMP, EWA, RF, CCP, HLA-B27, 14.3.3 eta, ACE level, tick-borne disease panel, HSV, HIV, Chlamydia, RPR, Hep B/C, Quantiferon TB  XR b/l hands/wrists (11/2022): Normal  XR b/l ankles/feet (11/2022): Normal  CT L ankle (2/2017): evidence of osteopenia of ankle bones. Large os trigonum the posterior aspect of the ankle.  MR L ankle (2/2017): Partial tear anterior tibiofibular ligament, anterior talofibular ligament, and calcaneofibular ligament with sprain deep fibers of the deltoid. Nondisplaced fracture plantar medial talar body adjacent to the medial facet of the subtalar joint. Contusion distal fibula. Large tibiotalar joint effusion. Prominent os trigonum with posttraumatic disruption of the synchondrosis. Posterior soft tissue edema with synovitis and tenosynovitis of the flexor hallucis

## 2024-11-05 ENCOUNTER — OUTPATIENT (OUTPATIENT)
Dept: OUTPATIENT SERVICES | Facility: HOSPITAL | Age: 34
LOS: 1 days | End: 2024-11-05

## 2024-11-05 ENCOUNTER — NON-APPOINTMENT (OUTPATIENT)
Age: 34
End: 2024-11-05

## 2024-11-05 ENCOUNTER — APPOINTMENT (OUTPATIENT)
Dept: INTERNAL MEDICINE | Facility: CLINIC | Age: 34
End: 2024-11-05

## 2024-11-06 ENCOUNTER — NON-APPOINTMENT (OUTPATIENT)
Age: 34
End: 2024-11-06

## 2025-03-07 ENCOUNTER — APPOINTMENT (OUTPATIENT)
Dept: RHEUMATOLOGY | Facility: CLINIC | Age: 35
End: 2025-03-07

## 2025-03-13 RX ORDER — ADALIMUMAB 40MG/0.4ML
40 KIT SUBCUTANEOUS
Qty: 1 | Refills: 5 | Status: ACTIVE | COMMUNITY
Start: 2025-03-13 | End: 1900-01-01

## 2025-03-26 ENCOUNTER — APPOINTMENT (OUTPATIENT)
Dept: RHEUMATOLOGY | Facility: CLINIC | Age: 35
End: 2025-03-26
Payer: COMMERCIAL

## 2025-03-26 VITALS
BODY MASS INDEX: 25.77 KG/M2 | DIASTOLIC BLOOD PRESSURE: 75 MMHG | HEART RATE: 74 BPM | WEIGHT: 180 LBS | HEIGHT: 70 IN | SYSTOLIC BLOOD PRESSURE: 119 MMHG | OXYGEN SATURATION: 100 %

## 2025-03-26 DIAGNOSIS — Z79.899 OTHER LONG TERM (CURRENT) DRUG THERAPY: ICD-10-CM

## 2025-03-26 DIAGNOSIS — M19.90 UNSPECIFIED OSTEOARTHRITIS, UNSPECIFIED SITE: ICD-10-CM

## 2025-03-26 DIAGNOSIS — M06.9 RHEUMATOID ARTHRITIS, UNSPECIFIED: ICD-10-CM

## 2025-03-26 PROCEDURE — 36415 COLL VENOUS BLD VENIPUNCTURE: CPT

## 2025-03-26 PROCEDURE — 99214 OFFICE O/P EST MOD 30 MIN: CPT

## 2025-03-26 PROCEDURE — G2211 COMPLEX E/M VISIT ADD ON: CPT | Mod: NC

## 2025-03-27 LAB
ALBUMIN SERPL ELPH-MCNC: 4.7 G/DL
ALP BLD-CCNC: 53 U/L
ALT SERPL-CCNC: 22 U/L
ANION GAP SERPL CALC-SCNC: 13 MMOL/L
AST SERPL-CCNC: 25 U/L
BASOPHILS # BLD AUTO: 0.03 K/UL
BASOPHILS NFR BLD AUTO: 0.6 %
BILIRUB SERPL-MCNC: 0.6 MG/DL
BUN SERPL-MCNC: 17 MG/DL
CALCIUM SERPL-MCNC: 9.7 MG/DL
CHLORIDE SERPL-SCNC: 101 MMOL/L
CO2 SERPL-SCNC: 25 MMOL/L
CREAT SERPL-MCNC: 1.16 MG/DL
CRP SERPL-MCNC: <3 MG/L
EGFRCR SERPLBLD CKD-EPI 2021: 84 ML/MIN/1.73M2
EOSINOPHIL # BLD AUTO: 0.03 K/UL
EOSINOPHIL NFR BLD AUTO: 0.6 %
ERYTHROCYTE [SEDIMENTATION RATE] IN BLOOD BY WESTERGREN METHOD: 8 MM/HR
GLUCOSE SERPL-MCNC: 105 MG/DL
HCT VFR BLD CALC: 42.9 %
HGB BLD-MCNC: 14.4 G/DL
IMM GRANULOCYTES NFR BLD AUTO: 0.4 %
LYMPHOCYTES # BLD AUTO: 2.17 K/UL
LYMPHOCYTES NFR BLD AUTO: 45.3 %
MAN DIFF?: NORMAL
MCHC RBC-ENTMCNC: 28.7 PG
MCHC RBC-ENTMCNC: 33.6 G/DL
MCV RBC AUTO: 85.5 FL
MONOCYTES # BLD AUTO: 0.29 K/UL
MONOCYTES NFR BLD AUTO: 6.1 %
NEUTROPHILS # BLD AUTO: 2.25 K/UL
NEUTROPHILS NFR BLD AUTO: 47 %
PLATELET # BLD AUTO: 249 K/UL
POTASSIUM SERPL-SCNC: 4.3 MMOL/L
PROT SERPL-MCNC: 7.9 G/DL
RBC # BLD: 5.02 M/UL
RBC # FLD: 12.9 %
SODIUM SERPL-SCNC: 140 MMOL/L
WBC # FLD AUTO: 4.79 K/UL

## 2025-03-28 LAB
HAV IGM SER QL: NONREACTIVE
HBV CORE IGG+IGM SER QL: NONREACTIVE
HBV CORE IGM SER QL: NONREACTIVE
HBV SURFACE AG SER QL: NONREACTIVE
HCV AB SER QL: NONREACTIVE
HCV S/CO RATIO: 0.21 S/CO

## 2025-03-31 LAB
M TB IFN-G BLD-IMP: NEGATIVE
QUANTIFERON TB PLUS MITOGEN MINUS NIL: >10 IU/ML
QUANTIFERON TB PLUS NIL: 0.03 IU/ML
QUANTIFERON TB PLUS TB1 MINUS NIL: 0.01 IU/ML
QUANTIFERON TB PLUS TB2 MINUS NIL: 0 IU/ML

## 2025-05-02 ENCOUNTER — OUTPATIENT (OUTPATIENT)
Dept: OUTPATIENT SERVICES | Facility: HOSPITAL | Age: 35
LOS: 1 days | End: 2025-05-02

## 2025-05-02 ENCOUNTER — APPOINTMENT (OUTPATIENT)
Dept: INTERNAL MEDICINE | Facility: CLINIC | Age: 35
End: 2025-05-02

## 2025-05-02 ENCOUNTER — NON-APPOINTMENT (OUTPATIENT)
Age: 35
End: 2025-05-02

## 2025-05-05 ENCOUNTER — NON-APPOINTMENT (OUTPATIENT)
Age: 35
End: 2025-05-05

## 2025-09-17 ENCOUNTER — APPOINTMENT (OUTPATIENT)
Dept: RHEUMATOLOGY | Facility: CLINIC | Age: 35
End: 2025-09-17